# Patient Record
Sex: FEMALE | Race: OTHER | HISPANIC OR LATINO | URBAN - METROPOLITAN AREA
[De-identification: names, ages, dates, MRNs, and addresses within clinical notes are randomized per-mention and may not be internally consistent; named-entity substitution may affect disease eponyms.]

---

## 2017-06-14 ENCOUNTER — EMERGENCY (EMERGENCY)
Facility: HOSPITAL | Age: 42
LOS: 1 days | Discharge: HOME | End: 2017-06-14
Attending: EMERGENCY MEDICINE | Admitting: EMERGENCY MEDICINE
Payer: COMMERCIAL

## 2017-06-14 VITALS
HEART RATE: 76 BPM | SYSTOLIC BLOOD PRESSURE: 127 MMHG | DIASTOLIC BLOOD PRESSURE: 65 MMHG | OXYGEN SATURATION: 99 % | TEMPERATURE: 98 F | WEIGHT: 145.95 LBS | RESPIRATION RATE: 18 BRPM

## 2017-06-14 DIAGNOSIS — N60.01 SOLITARY CYST OF RIGHT BREAST: ICD-10-CM

## 2017-06-14 DIAGNOSIS — R21 RASH AND OTHER NONSPECIFIC SKIN ERUPTION: ICD-10-CM

## 2017-06-14 LAB
ALBUMIN SERPL ELPH-MCNC: 4.2 G/DL — SIGNIFICANT CHANGE UP (ref 3.3–5)
ALP SERPL-CCNC: 117 U/L — SIGNIFICANT CHANGE UP (ref 40–120)
ALT FLD-CCNC: 18 U/L — SIGNIFICANT CHANGE UP (ref 10–45)
ANION GAP SERPL CALC-SCNC: 11 MMOL/L — SIGNIFICANT CHANGE UP (ref 5–17)
AST SERPL-CCNC: 24 U/L — SIGNIFICANT CHANGE UP (ref 10–40)
BASOPHILS NFR BLD AUTO: 0.5 % — SIGNIFICANT CHANGE UP (ref 0–2)
BILIRUB SERPL-MCNC: 0.2 MG/DL — SIGNIFICANT CHANGE UP (ref 0.2–1.2)
BUN SERPL-MCNC: 14 MG/DL — SIGNIFICANT CHANGE UP (ref 7–23)
CALCIUM SERPL-MCNC: 9.2 MG/DL — SIGNIFICANT CHANGE UP (ref 8.4–10.5)
CHLORIDE SERPL-SCNC: 104 MMOL/L — SIGNIFICANT CHANGE UP (ref 96–108)
CO2 SERPL-SCNC: 22 MMOL/L — SIGNIFICANT CHANGE UP (ref 22–31)
CREAT SERPL-MCNC: 0.7 MG/DL — SIGNIFICANT CHANGE UP (ref 0.5–1.3)
EOSINOPHIL NFR BLD AUTO: 7.2 % — HIGH (ref 0–6)
GLUCOSE SERPL-MCNC: 131 MG/DL — HIGH (ref 70–99)
HCT VFR BLD CALC: 37.6 % — SIGNIFICANT CHANGE UP (ref 34.5–45)
HGB BLD-MCNC: 12.5 G/DL — SIGNIFICANT CHANGE UP (ref 11.5–15.5)
LYMPHOCYTES # BLD AUTO: 27.2 % — SIGNIFICANT CHANGE UP (ref 13–44)
MCHC RBC-ENTMCNC: 28.9 PG — SIGNIFICANT CHANGE UP (ref 27–34)
MCHC RBC-ENTMCNC: 33.2 G/DL — SIGNIFICANT CHANGE UP (ref 32–36)
MCV RBC AUTO: 87 FL — SIGNIFICANT CHANGE UP (ref 80–100)
MONOCYTES NFR BLD AUTO: 6.6 % — SIGNIFICANT CHANGE UP (ref 2–14)
NEUTROPHILS NFR BLD AUTO: 58.5 % — SIGNIFICANT CHANGE UP (ref 43–77)
PLATELET # BLD AUTO: 252 K/UL — SIGNIFICANT CHANGE UP (ref 150–400)
POTASSIUM SERPL-MCNC: 4.7 MMOL/L — SIGNIFICANT CHANGE UP (ref 3.5–5.3)
POTASSIUM SERPL-SCNC: 4.7 MMOL/L — SIGNIFICANT CHANGE UP (ref 3.5–5.3)
PROT SERPL-MCNC: 7.9 G/DL — SIGNIFICANT CHANGE UP (ref 6–8.3)
RBC # BLD: 4.32 M/UL — SIGNIFICANT CHANGE UP (ref 3.8–5.2)
RBC # FLD: 13.7 % — SIGNIFICANT CHANGE UP (ref 10.3–16.9)
SODIUM SERPL-SCNC: 137 MMOL/L — SIGNIFICANT CHANGE UP (ref 135–145)
WBC # BLD: 8.4 K/UL — SIGNIFICANT CHANGE UP (ref 3.8–10.5)
WBC # FLD AUTO: 8.4 K/UL — SIGNIFICANT CHANGE UP (ref 3.8–10.5)

## 2017-06-14 PROCEDURE — 80053 COMPREHEN METABOLIC PANEL: CPT

## 2017-06-14 PROCEDURE — 36415 COLL VENOUS BLD VENIPUNCTURE: CPT

## 2017-06-14 PROCEDURE — 99284 EMERGENCY DEPT VISIT MOD MDM: CPT

## 2017-06-14 PROCEDURE — 76642 ULTRASOUND BREAST LIMITED: CPT | Mod: 26,RT

## 2017-06-14 PROCEDURE — 76642 ULTRASOUND BREAST LIMITED: CPT

## 2017-06-14 PROCEDURE — 85025 COMPLETE CBC W/AUTO DIFF WBC: CPT

## 2017-06-14 PROCEDURE — 99284 EMERGENCY DEPT VISIT MOD MDM: CPT | Mod: 25

## 2017-06-14 NOTE — CONSULT NOTE ADULT - ASSESSMENT
43 yo F with likely benign breast cyst.     Patient should call Dr. Birmingham's office in the morning-- [phone] and call to make an appointment for Friday morning at 10:30am.     Patient discussed with chief and attending.

## 2017-06-14 NOTE — ED PROVIDER NOTE - OBJECTIVE STATEMENT
42 year old female with no significant medical history here with tender lesion on R breast which first appeared yesterday. LMP 5/28/17. Right breast is warm to touch with redness of the skin. Had normal mammo 1-2 years ago. Scheduled for mammo next week. Never had tender breasts before. Denies any nipple discharge. Denies fever, chills, trauma or any skin breakage.

## 2017-06-14 NOTE — ED PROVIDER NOTE - SKIN COLOR
Right breast with erythema lateral to the nipple and warmth to touch. Firm tender lesion superficially underneath area of erythema. Whole right breast tender and cystic to palpation compared to the left breast. No axillary LAD noted. No nipple discharge bilaterally

## 2017-06-14 NOTE — ED PROVIDER NOTE - PROGRESS NOTE DETAILS
currently in US. Surgery consult called to evaluate for breast abscess. Awaiting recs. Discussed with surgery. Cyst seen on US. Needs to call Dr. Birmingham's office in AM ([phone]) to make appointment for Friday at 10:30 AM.

## 2017-06-14 NOTE — ED PROVIDER NOTE - SKIN [+], MLM
right breast with lacy reticular pattern of erythema in ring litke pattern at 11 am surrounding right breast 11-2 pm

## 2017-06-14 NOTE — ED PROVIDER NOTE - MEDICAL DECISION MAKING DETAILS
discussed with Surgery resident. Right breast cyst seen on US. Patient will follow up with Dr. Birmingham as an outpatient.

## 2017-06-14 NOTE — ED PROVIDER NOTE - ATTENDING CONTRIBUTION TO CARE
43 yo Female with tenderness right breast at 11 o'clock x 1 day-- slight ring of lacy erythema  no fluctuance or discrete mass  no adenopathy-  no prior hx of breast cancer or FH of breast cancer- LMP  5/28/17- U/S  no abscess - surgery shay pt prefers to hold on abx for now- they do not feel this is cellulitis -- warm compresses  and to see dr villavicencio 6-16-17  return precautions include  fever increased redness swelling or pain

## 2017-08-21 NOTE — CONSULT NOTE ADULT - SUBJECTIVE AND OBJECTIVE BOX
43 yo F otherwise healthy presents with 1 day of R breast tenderness and swelling. She gets regular mamograms, last one 1-2 years ago (next one scheduled for next week) but has never had any abnormalities. Never had any breast abscesses/masses/malignancy before. Denies diabetes or steroid use. No fevers/chills. No other associated symptoms. No skin erythema.     PMH: None  Meds: MV (no OCPs)  PSH: None (4 children born vaginally)  SH: Nonsmoker, occasional drinker    Vital Signs Last 24 Hrs  T(C): 36.7, Max: 36.7 (06-14 @ 19:44)  T(F): 98.1, Max: 98.1 (06-14 @ 19:44)  HR: 76 (76 - 76)  BP: 127/65 (127/65 - 127/65)  BP(mean): --  RR: 18 (18 - 18)  SpO2: 99% (99% - 99%)    PHYSICAL EXAM:    Constitutional: Alert and conversant in NAD    Respiratory: nonlabored    Breast: Deep smooth lesion palpated at 11oclock below R areola. No skin changes or erythema. Slightly tender although able to tolerate exam.                           12.5   8.4   )-----------( 252      ( 14 Jun 2017 20:41 )             37.6   14 Jun 2017 20:41    137    |  104    |  14     ----------------------------<  131    4.7     |  22     |  0.70     Ca    9.2        14 Jun 2017 20:41    TPro  7.9    /  Alb  4.2    /  TBili  0.2    /  DBili  x      /  AST  24     /  ALT  18     /  AlkPhos  117    14 Jun 2017 20:41        EXAM:  US BREAST LIMITED RT                          PROCEDURE DATE:  06/14/2017                     INTERPRETATION:    FOCUSED RIGHT BREAST ULTRASOUND dated 6/14/2017 9:16 PM:    INDICATION: 42-year old female. Tender right breast with erythema and   firm mass adjacent the nipple.    COMPARISON: None    Findings: Focused sonographic images of the right breast at the patient's   indicated site of tenderness and palpated mass was performed. These   images demonstrate a simple anechoic cystic lesion in the 12:00 right   breast 2 cm from the nipple measuring 1.8 x 1.0 x 1.6 cm, consistent with   benign breast cyst. There is an additional 0.4 x 0.3 x 0.5 cm   benign-appearing anechoic cyst in the 6:00 right breast, 4 cm from the   nipple. Thereis an additional 0.4 x 0.4 x 0.4 cm benign-appearing cyst   in the 9:00 right breast, 6 cm from the nipple. There is an additional   2.5 x 1.8 x 2.4 cm benign-appearing cyst in the 11:00 right breast near   the nipple. None of these cysts demonstratesolid components or   associated hypervascularity.      IMPRESSION:  1.  No abscess.  2.  Benign appearing breast cysts as described above.   3.  Follow-up with outpatient breast imaging is recommended if symptoms   persist.            "Thank you forthe opportunity to participate in the care of this   patient."    SAUMYA HALLMAN M.D., RADIOLOGY RESIDENT  This document has been electronically signed.  LASHAY RODRIGUEZ M.D., ATTENDING RADIOLOGIST  This document has been electronically signed. Jun 14 2017  9:46PM [phone]
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
